# Patient Record
Sex: FEMALE | Race: BLACK OR AFRICAN AMERICAN | NOT HISPANIC OR LATINO | Employment: UNEMPLOYED | URBAN - METROPOLITAN AREA
[De-identification: names, ages, dates, MRNs, and addresses within clinical notes are randomized per-mention and may not be internally consistent; named-entity substitution may affect disease eponyms.]

---

## 2023-06-26 ENCOUNTER — HOSPITAL ENCOUNTER (OUTPATIENT)
Dept: DATA CONVERSION | Facility: HOSPITAL | Age: 33
End: 2023-06-27
Attending: OBSTETRICS & GYNECOLOGY
Payer: MEDICARE

## 2023-06-26 DIAGNOSIS — O9A.213 INJURY, POISONING AND CERTAIN OTHER CONSEQUENCES OF EXTERNAL CAUSES COMPLICATING PREGNANCY, THIRD TRIMESTER (HHS-HCC): ICD-10-CM

## 2023-06-26 DIAGNOSIS — R10.9 UNSPECIFIED ABDOMINAL PAIN: ICD-10-CM

## 2023-06-26 DIAGNOSIS — K62.89 OTHER SPECIFIED DISEASES OF ANUS AND RECTUM: ICD-10-CM

## 2023-06-26 DIAGNOSIS — O09.33 SUPERVISION OF PREGNANCY WITH INSUFFICIENT ANTENATAL CARE, THIRD TRIMESTER (HHS-HCC): ICD-10-CM

## 2023-06-26 DIAGNOSIS — O99.843 BARIATRIC SURGERY STATUS COMPLICATING PREGNANCY, THIRD TRIMESTER (HHS-HCC): ICD-10-CM

## 2023-06-26 DIAGNOSIS — O99.333 SMOKING (TOBACCO) COMPLICATING PREGNANCY, THIRD TRIMESTER (HHS-HCC): ICD-10-CM

## 2023-06-26 DIAGNOSIS — Z3A.28 28 WEEKS GESTATION OF PREGNANCY (HHS-HCC): ICD-10-CM

## 2023-06-26 DIAGNOSIS — F17.200 NICOTINE DEPENDENCE, UNSPECIFIED, UNCOMPLICATED: ICD-10-CM

## 2023-06-26 DIAGNOSIS — S59.811A: ICD-10-CM

## 2023-06-26 DIAGNOSIS — O99.613 DISEASES OF THE DIGESTIVE SYSTEM COMPLICATING PREGNANCY, THIRD TRIMESTER (HHS-HCC): ICD-10-CM

## 2023-06-26 DIAGNOSIS — O26.893 OTHER SPECIFIED PREGNANCY RELATED CONDITIONS, THIRD TRIMESTER (HHS-HCC): ICD-10-CM

## 2023-06-26 DIAGNOSIS — Z34.80 ENCOUNTER FOR SUPERVISION OF OTHER NORMAL PREGNANCY, UNSPECIFIED TRIMESTER (HHS-HCC): ICD-10-CM

## 2023-09-29 ENCOUNTER — HOSPITAL ENCOUNTER (OUTPATIENT)
Dept: DATA CONVERSION | Facility: HOSPITAL | Age: 33
Discharge: HOME | End: 2023-09-29
Attending: STUDENT IN AN ORGANIZED HEALTH CARE EDUCATION/TRAINING PROGRAM | Admitting: STUDENT IN AN ORGANIZED HEALTH CARE EDUCATION/TRAINING PROGRAM
Payer: MEDICARE

## 2023-09-29 VITALS
SYSTOLIC BLOOD PRESSURE: 132 MMHG | OXYGEN SATURATION: 100 % | RESPIRATION RATE: 18 BRPM | HEART RATE: 100 BPM | HEIGHT: 63 IN | DIASTOLIC BLOOD PRESSURE: 85 MMHG | TEMPERATURE: 98.2 F

## 2023-09-29 LAB
ALANINE AMINOTRANSFERASE (SGPT) (U/L) IN SER/PLAS: 11 U/L (ref 7–45)
ALBUMIN (G/DL) IN SER/PLAS: 4.3 G/DL (ref 3.4–5)
ALKALINE PHOSPHATASE (U/L) IN SER/PLAS: 52 U/L (ref 33–110)
ANION GAP IN SER/PLAS: 13 MMOL/L (ref 10–20)
ASPARTATE AMINOTRANSFERASE (SGOT) (U/L) IN SER/PLAS: 12 U/L (ref 9–39)
BILIRUBIN TOTAL (MG/DL) IN SER/PLAS: 0.3 MG/DL (ref 0–1.2)
CALCIUM (MG/DL) IN SER/PLAS: 9.3 MG/DL (ref 8.6–10.6)
CARBON DIOXIDE, TOTAL (MMOL/L) IN SER/PLAS: 25 MMOL/L (ref 21–32)
CHLORIDE (MMOL/L) IN SER/PLAS: 106 MMOL/L (ref 98–107)
CREATININE (MG/DL) IN SER/PLAS: 0.7 MG/DL (ref 0.5–1.05)
ERYTHROCYTE DISTRIBUTION WIDTH (RATIO) BY AUTOMATED COUNT: 13.2 % (ref 11.5–14.5)
ERYTHROCYTE MEAN CORPUSCULAR HEMOGLOBIN CONCENTRATION (G/DL) BY AUTOMATED: 30.1 G/DL (ref 32–36)
ERYTHROCYTE MEAN CORPUSCULAR VOLUME (FL) BY AUTOMATED COUNT: 77 FL (ref 80–100)
ERYTHROCYTES (10*6/UL) IN BLOOD BY AUTOMATED COUNT: 4.81 X10E12/L (ref 4–5.2)
GFR FEMALE: >90 ML/MIN/1.73M2
GLUCOSE (MG/DL) IN SER/PLAS: 88 MG/DL (ref 74–99)
HEMATOCRIT (%) IN BLOOD BY AUTOMATED COUNT: 36.9 % (ref 36–46)
HEMOGLOBIN (G/DL) IN BLOOD: 11.1 G/DL (ref 12–16)
LEUKOCYTES (10*3/UL) IN BLOOD BY AUTOMATED COUNT: 4.1 X10E9/L (ref 4.4–11.3)
NRBC (PER 100 WBCS) BY AUTOMATED COUNT: 0 /100 WBC (ref 0–0)
PLATELETS (10*3/UL) IN BLOOD AUTOMATED COUNT: 338 X10E9/L (ref 150–450)
POTASSIUM (MMOL/L) IN SER/PLAS: 4.2 MMOL/L (ref 3.5–5.3)
PROTEIN TOTAL: 6.7 G/DL (ref 6.4–8.2)
SODIUM (MMOL/L) IN SER/PLAS: 140 MMOL/L (ref 136–145)
UREA NITROGEN (MG/DL) IN SER/PLAS: 13 MG/DL (ref 6–23)
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
## 2023-10-02 ENCOUNTER — TELEPHONE (OUTPATIENT)
Dept: POSTPARTUM | Facility: HOSPITAL | Age: 33
End: 2023-10-02

## 2023-10-06 PROBLEM — K61.0 PERIANAL ABSCESS: Status: ACTIVE | Noted: 2023-10-06

## 2023-10-06 PROBLEM — R07.9 CHEST PAIN: Status: ACTIVE | Noted: 2022-11-27

## 2023-10-06 PROBLEM — O99.340 DEPRESSION AFFECTING PREGNANCY (HHS-HCC): Status: ACTIVE | Noted: 2019-09-18

## 2023-10-06 PROBLEM — K60.3 ANAL FISTULA: Chronic | Status: ACTIVE | Noted: 2019-09-20

## 2023-10-06 PROBLEM — F32.A DEPRESSION AFFECTING PREGNANCY (HHS-HCC): Status: ACTIVE | Noted: 2019-09-18

## 2023-10-06 PROBLEM — O99.210 MATERNAL OBESITY AFFECTING PREGNANCY, ANTEPARTUM (HHS-HCC): Status: ACTIVE | Noted: 2019-11-20

## 2023-10-06 PROBLEM — K21.9 GERD (GASTROESOPHAGEAL REFLUX DISEASE): Status: ACTIVE | Noted: 2017-07-03

## 2023-10-06 PROBLEM — N94.9 ROUND LIGAMENT PAIN: Status: ACTIVE | Noted: 2020-02-07

## 2023-10-06 PROBLEM — F41.1 GENERALIZED ANXIETY DISORDER: Status: ACTIVE | Noted: 2021-08-10

## 2023-10-06 PROBLEM — K61.1 PERIRECTAL ABSCESS: Status: ACTIVE | Noted: 2018-04-20

## 2023-10-06 PROBLEM — A63.0 CONDYLOMA: Status: ACTIVE | Noted: 2019-12-12

## 2023-10-06 PROBLEM — O99.810 GLUCOSE INTOLERANCE OF PREGNANCY (HHS-HCC): Status: ACTIVE | Noted: 2020-02-10

## 2023-10-06 PROBLEM — F32.2 MAJOR DEPRESSIVE DISORDER, SINGLE EPISODE, SEVERE WITHOUT PSYCHOTIC FEATURES (MULTI): Chronic | Status: ACTIVE | Noted: 2021-08-10

## 2023-10-06 PROBLEM — R79.89 ELEVATED LFTS: Status: ACTIVE | Noted: 2022-11-27

## 2023-10-06 PROBLEM — E66.01 MORBID OBESITY (MULTI): Status: ACTIVE | Noted: 2017-04-11

## 2023-10-06 PROBLEM — G47.33 OSA (OBSTRUCTIVE SLEEP APNEA): Status: ACTIVE | Noted: 2017-07-03

## 2023-10-06 PROBLEM — N83.201 CYST OF RIGHT OVARY: Status: ACTIVE | Noted: 2019-12-12

## 2023-10-06 PROBLEM — O99.810 ABNORMAL GLUCOSE AFFECTING PREGNANCY (HHS-HCC): Status: ACTIVE | Noted: 2020-01-30

## 2023-10-06 PROBLEM — O09.899 SUPERVISION OF OTHER HIGH RISK PREGNANCY, ANTEPARTUM (HHS-HCC): Status: ACTIVE | Noted: 2019-11-26

## 2023-10-06 PROBLEM — R79.89 ELEVATED TROPONIN: Status: ACTIVE | Noted: 2022-11-27

## 2023-10-06 RX ORDER — ACETAMINOPHEN 500 MG/1
1000 CAPSULE, LIQUID FILLED ORAL EVERY 6 HOURS PRN
COMMUNITY
Start: 2020-03-31

## 2023-10-06 RX ORDER — HYDROCORTISONE ACETATE 25 MG/1
25 SUPPOSITORY RECTAL 2 TIMES DAILY PRN
COMMUNITY
Start: 2022-05-23

## 2023-10-06 RX ORDER — DIPHENHYDRAMINE HCL 25 MG
25 TABLET ORAL EVERY 6 HOURS PRN
COMMUNITY
Start: 2023-09-08

## 2023-10-06 RX ORDER — DOCUSATE SODIUM 100 MG/1
100 CAPSULE, LIQUID FILLED ORAL 2 TIMES DAILY PRN
COMMUNITY
Start: 2023-09-08

## 2023-10-06 RX ORDER — ONDANSETRON 4 MG/1
4 TABLET, FILM COATED ORAL EVERY 12 HOURS PRN
COMMUNITY
Start: 2022-11-29

## 2023-10-06 RX ORDER — CETIRIZINE HYDROCHLORIDE 10 MG/1
10 TABLET ORAL
COMMUNITY
Start: 2021-11-10

## 2023-10-06 RX ORDER — AMMONIUM LACTATE 12 G/100G
LOTION TOPICAL 2 TIMES DAILY
COMMUNITY
Start: 2022-05-23

## 2023-10-06 RX ORDER — NALOXONE HYDROCHLORIDE 4 MG/.1ML
4 SPRAY NASAL AS NEEDED
COMMUNITY
Start: 2021-08-25

## 2023-10-06 RX ORDER — IBUPROFEN 600 MG/1
600 TABLET ORAL 4 TIMES DAILY
COMMUNITY
Start: 2023-09-08

## 2023-10-06 RX ORDER — BACITRACIN 500 [USP'U]/G
OINTMENT TOPICAL
COMMUNITY
Start: 2023-06-05

## 2023-10-06 RX ORDER — HYDROCORTISONE 25 MG/G
CREAM TOPICAL
COMMUNITY
Start: 2020-03-02

## 2023-10-06 RX ORDER — FERROUS SULFATE 325(65) MG
325 TABLET ORAL 2 TIMES DAILY
COMMUNITY
Start: 2022-11-28

## 2023-10-06 RX ORDER — BENZONATATE 100 MG/1
100 CAPSULE ORAL 3 TIMES DAILY PRN
COMMUNITY
Start: 2022-11-29

## 2023-10-06 RX ORDER — IBUPROFEN 200 MG
1 TABLET ORAL EVERY 24 HOURS
COMMUNITY
Start: 2022-11-28

## 2023-10-06 RX ORDER — OMEPRAZOLE 40 MG/1
1 CAPSULE, DELAYED RELEASE ORAL DAILY
COMMUNITY
Start: 2023-07-12

## 2023-10-11 ENCOUNTER — APPOINTMENT (OUTPATIENT)
Dept: DERMATOLOGY | Facility: CLINIC | Age: 33
End: 2023-10-11
Payer: COMMERCIAL

## 2023-10-12 ENCOUNTER — APPOINTMENT (OUTPATIENT)
Dept: DERMATOLOGY | Facility: CLINIC | Age: 33
End: 2023-10-12
Payer: COMMERCIAL

## 2023-10-14 ENCOUNTER — PHARMACY VISIT (OUTPATIENT)
Dept: PHARMACY | Facility: CLINIC | Age: 33
End: 2023-10-14
Payer: MEDICARE

## 2023-10-14 RX ORDER — KETOCONAZOLE 20 MG/ML
SHAMPOO, SUSPENSION TOPICAL
Qty: 120 ML | Refills: 1 | OUTPATIENT
Start: 2023-06-23 | End: 2023-10-14 | Stop reason: SDUPTHER

## 2023-11-16 ENCOUNTER — HOSPITAL ENCOUNTER (EMERGENCY)
Facility: HOSPITAL | Age: 33
Discharge: HOME | End: 2023-11-16
Payer: MEDICARE

## 2023-11-16 PROCEDURE — 4500999001 HC ED NO CHARGE

## 2023-12-07 ENCOUNTER — HOSPITAL ENCOUNTER (EMERGENCY)
Facility: HOSPITAL | Age: 33
Discharge: HOME | End: 2023-12-07
Attending: EMERGENCY MEDICINE
Payer: MEDICARE

## 2023-12-07 VITALS
SYSTOLIC BLOOD PRESSURE: 137 MMHG | HEIGHT: 63 IN | OXYGEN SATURATION: 98 % | HEART RATE: 76 BPM | DIASTOLIC BLOOD PRESSURE: 92 MMHG | WEIGHT: 180 LBS | BODY MASS INDEX: 31.89 KG/M2 | TEMPERATURE: 96.6 F | RESPIRATION RATE: 20 BRPM

## 2023-12-07 DIAGNOSIS — B80 PINWORMS: Primary | ICD-10-CM

## 2023-12-07 DIAGNOSIS — M79.601 PAIN OF RIGHT UPPER EXTREMITY: ICD-10-CM

## 2023-12-07 PROCEDURE — 99283 EMERGENCY DEPT VISIT LOW MDM: CPT | Performed by: EMERGENCY MEDICINE

## 2023-12-07 PROCEDURE — 99284 EMERGENCY DEPT VISIT MOD MDM: CPT | Performed by: EMERGENCY MEDICINE

## 2023-12-07 PROCEDURE — 2500000001 HC RX 250 WO HCPCS SELF ADMINISTERED DRUGS (ALT 637 FOR MEDICARE OP): Mod: SE | Performed by: EMERGENCY MEDICINE

## 2023-12-07 RX ORDER — TRAMADOL HYDROCHLORIDE 50 MG/1
50 TABLET ORAL EVERY 6 HOURS PRN
Qty: 10 TABLET | Refills: 0 | Status: SHIPPED | OUTPATIENT
Start: 2023-12-07 | End: 2023-12-10

## 2023-12-07 RX ORDER — IBUPROFEN 600 MG/1
600 TABLET ORAL ONCE
Status: COMPLETED | OUTPATIENT
Start: 2023-12-07 | End: 2023-12-07

## 2023-12-07 RX ORDER — ALBENDAZOLE 200 MG/1
400 TABLET, FILM COATED ORAL ONCE
Qty: 4 TABLET | Refills: 0 | Status: SHIPPED | OUTPATIENT
Start: 2023-12-07 | End: 2023-12-07

## 2023-12-07 RX ADMIN — IBUPROFEN 600 MG: 600 TABLET, FILM COATED ORAL at 14:21

## 2023-12-07 ASSESSMENT — COLUMBIA-SUICIDE SEVERITY RATING SCALE - C-SSRS
1. IN THE PAST MONTH, HAVE YOU WISHED YOU WERE DEAD OR WISHED YOU COULD GO TO SLEEP AND NOT WAKE UP?: NO
2. HAVE YOU ACTUALLY HAD ANY THOUGHTS OF KILLING YOURSELF?: NO
6. HAVE YOU EVER DONE ANYTHING, STARTED TO DO ANYTHING, OR PREPARED TO DO ANYTHING TO END YOUR LIFE?: NO

## 2023-12-07 NOTE — ED PROVIDER NOTES
"HPI   Chief Complaint   Patient presents with    Headache       HPI:  33-year-old female who denies any significant medical history presents the emergency department with her 2 children with a chief complaint of pinworms.  She states that she has had the rash on her right forearm for \"a long time\" at least a year and has been toyed told that it is a keloid but she says that she has a lot of like itching pain burning sensation in that rash and she is concerned about having pinworms.  She states that she has noticed in her daughter that she has been scratching a lot at her rectal area and it went on for a long time she actually feels bad because she did not know what was going on but then she started googling symptoms and then she could see worms that look like pinworms in her anal area.  She plans on taking her children to [place] ER for evaluation when she leaves the emergency department here.  She states she has been using calamine lotion and applying it to the rash she is very embarrassed by the rash and feels that she needs some kind of oral medications to help treat it because she tried topical creams in the past and nothing has seemed to work.  She says that she went to an appointment at dermatology today but she said they would not see her because she did not have her ID on her and she states that they were pretty rude and she has not had any subspecialist evaluate this rash.    Limitations to history: Labile mood  Independent Historians: None  External Records Reviewed: EMR records  ------------------------------------------------------------------------------------------------------------------------------------------  ROS: a ten point review of systems was performed and negative except as per HPI.  ------------------------------------------------------------------------------------------------------------------------------------------  PMH / PSH: as per HPI, reviewed in EMR and discussed with the patient " []  MEDS:  reviewed in EMR and discussed with the patient []  ALLERGIES: reviewed in EMR[]  SocH:  as per HPI, otherwise reviewed in EMR []  FH:  as per HPI, otherwise reviewed in EMR []  ------------------------------------------------------------------------------------------------------------------------------------------  Physical Exam:  VS: As documented in the triage note and EMR flowsheet from this visit was reviewed  General: Well appearing. No acute distress.   Eyes:  Extraocular movements grossly intact. No scleral icterus.   HEENT: Atraumatic. Normocephalic.    Neck: Supple. No gross masses  MSK: Symmetric muscle bulk. No gross step offs or deformities.  Skin: Right forearm with dried pink calamine lotion over top of a raised large keloid area with a central pink area no obvious overlying infection no other abnormalities nontender to palpation warm extremities  Neuro: Speech fluent. Awake. Alert. Appropriate conversation.  Psych: Labile mood, possible delusion  ------------------------------------------------------------------------------------------------------------------------------------------  Hospital Course / Medical Decision Making:  The patient appears anxious that she is only requesting to see 1 provider and is embarrassed about the rash on her arm and does not want anyone else to see it.  I saw the patient in super track to avoid multiple providers having to see her her physical exam is reassuring I did review medical records and she has been seen in the ER with similar complaints in the past as well.  Her right forearm does seem to have a large keloid area anteriorly but I have no concern for infection no cellulitis and no concern for any kind of retained foreign body or parasitic disease process.  She tells me that clinically she sees worms are in the rectal area of her children and wants to know how to get rid of those and decontaminate the house I explained how and warm infected infection  spread and the children do go to .  I wanted to prescribe a topical cream for her right forearm however she was reluctant to try this as she says she has tried this in the past and it does not seem to work and really prefer to have an oral medication if her I did not examine her children but have obliged by giving her a prescription for albendazole and have advised her to follow-up with dermatology or plastic surgery for consideration of cosmetic repair    Although the patient is concerned about possible worms in her forearm it could be a delusion that she is having she denies any psychiatric mental health diagnoses the children that are with her do appear well-groomed and safe she says that she has not been breast-feeding and has been using a bottle to feed her son and I do not feel that they are in any immediate threat at this time and can keep the family structure together because I do not feel like she is a threat to herself right now or to others.  She was discharged from the ED with plan to go to [place] ER to have her children evaluated as well    Patient care discussed with:   Social Determinants affecting care:     Final diagnosis and disposition: [] Rash, possible pinworms            Neema Kimble MD                                      [place] Coma Scale Score: 15                  Patient History   History reviewed. No pertinent past medical history.  History reviewed. No pertinent surgical history.  No family history on file.  Social History     Tobacco Use    Smoking status: Not on file    Smokeless tobacco: Not on file   Substance Use Topics    Alcohol use: Not on file    Drug use: Not on file       Physical Exam   ED Triage Vitals [12/07/23 1332]   Temp Heart Rate Resp BP   35.9 °C (96.6 °F) 76 20 (!) 137/92      SpO2 Temp src Heart Rate Source Patient Position   98 % -- -- --      BP Location FiO2 (%)     -- --       Physical Exam    ED Course & MDM   Diagnoses as of 12/07/23 1639    Pinworms   Pain of right upper extremity       Medical Decision Making      Procedure  Procedures     Neema Kimble MD  12/07/23 8619

## 2023-12-21 ENCOUNTER — HOSPITAL ENCOUNTER (EMERGENCY)
Facility: HOSPITAL | Age: 33
Discharge: HOME | End: 2023-12-21

## 2023-12-21 VITALS
HEIGHT: 63 IN | WEIGHT: 180 LBS | BODY MASS INDEX: 31.89 KG/M2 | HEART RATE: 71 BPM | DIASTOLIC BLOOD PRESSURE: 74 MMHG | TEMPERATURE: 97.3 F | RESPIRATION RATE: 16 BRPM | SYSTOLIC BLOOD PRESSURE: 125 MMHG | OXYGEN SATURATION: 98 %

## 2023-12-21 PROCEDURE — 4500999001 HC ED NO CHARGE

## 2023-12-21 ASSESSMENT — COLUMBIA-SUICIDE SEVERITY RATING SCALE - C-SSRS
1. IN THE PAST MONTH, HAVE YOU WISHED YOU WERE DEAD OR WISHED YOU COULD GO TO SLEEP AND NOT WAKE UP?: NO
6. HAVE YOU EVER DONE ANYTHING, STARTED TO DO ANYTHING, OR PREPARED TO DO ANYTHING TO END YOUR LIFE?: NO
2. HAVE YOU ACTUALLY HAD ANY THOUGHTS OF KILLING YOURSELF?: NO

## 2023-12-21 ASSESSMENT — PAIN - FUNCTIONAL ASSESSMENT: PAIN_FUNCTIONAL_ASSESSMENT: 0-10

## 2023-12-21 ASSESSMENT — PAIN DESCRIPTION - LOCATION: LOCATION: HEAD

## 2023-12-21 ASSESSMENT — PAIN SCALES - GENERAL: PAINLEVEL_OUTOF10: 10 - WORST POSSIBLE PAIN

## 2023-12-21 NOTE — ED TRIAGE NOTES
Patient Education       1. Floragen/biogaia - probiotic  2. Nasal saline spray daily.  3. Vitamin D 2000 international units daily.    Well-Child Checkup: 11 to 13 Years     Physical activity is key to lifelong good health. Encourage your child to find activities that he or she enjoys.   Between ages 11 and 13, your child will grow and change a lot. It’s important to keep having yearly checkups so the healthcare provider can track this progress. As your child enters puberty, he or she may become more embarrassed about having a checkup. Reassure your child that the exam is normal and necessary. Be aware that the healthcare provider may ask to talk with the child without you in the exam room.   School and social issues  Here are some topics you, your child, and the healthcare provider may want to discuss during this visit:   · School performance. How is your child doing in school? Is homework finished on time? Does your child stay organized? These are skills you can help with. Keep in mind that a drop in school performance can be a sign of other problems.  · Friendships. Do you like your child’s friends? Do the friendships seem healthy? Make sure to talk to your child about who his or her friends are and how they spend time together. This is the age when peer pressure can start to be a problem.  · Life at home. How is your child’s behavior? Does he or she get along with others in the family? Is he or she respectful of you, other adults, and authority? Does your child participate in family events, or does he or she withdraw from other family members?  · Risky behaviors. It’s not too early to start talking to your child about drugs, alcohol, smoking, and sex. Make sure your child understands that these are not activities he or she should do, even if friends are. Answer your child’s questions, and don’t be afraid to ask questions of your own. Make sure your child knows he or she can always come to you for help. If  Pt to ed with c/o headache x2 days.   you’re not sure how to approach these topics, talk to the healthcare provider for advice.  Entering puberty  Puberty is the stage when a child begins to develop sexually into an adult. It usually starts between 9 and 14 for girls, and between 12 and 16 for boys. Here is some of what you can expect when puberty begins:   · Acne and body odor. Hormones that increase during puberty can cause acne (pimples) on the face and body. Hormones can also increase sweating and cause a stronger body odor. At this age, your child should begin to shower or bathe daily. Encourage your child to use deodorant and acne products as needed.  · Body changes in girls. Early in puberty, breasts begin to develop. One breast often starts to grow before the other. This is normal. Hair begins to grow in the pubic area, under the arms, and on the legs. Around 2 years after breasts begin to grow, a girl will start having monthly periods (menstruation). To help prepare your daughter for this change, talk to her about periods, what to expect, and how to use feminine products.  · Body changes in boys. At the start of puberty, the testicles drop lower and the scrotum darkens and becomes looser. Hair begins to grow in the pubic area, under the arms, and on the legs, chest, and face. The voice changes, becoming lower and deeper. As the penis grows and matures, erections and “wet dreams” begin to happen. Reassure your son that this is normal.  · Emotional changes. Along with these physical changes, you’ll likely notice changes in your child’s personality. You may notice your child developing an interest in dating and becoming “more than friends” with others. Also, many kids become tatum and develop an attitude around puberty. This can be frustrating, but it is very normal. Try to be patient and consistent. Encourage conversations, even when your child doesn’t seem to want to talk. No matter how your child acts, he or she still needs a parent.  Nutrition  and exercise tips  Today, kids are less active and eat more junk food than ever before. Your child is starting to make choices about what to eat and how active to be. You can’t always have the final say, but you can help your child develop healthy habits. Here are some tips:   · Help your child get at least 30 to 60 minutes of activity every day. The time can be broken up throughout the day. If the weather’s bad or you’re worried about safety, find supervised indoor activities.   · Limit “screen time” to 1 hour each day. This includes time spent watching TV, playing video games, using the computer, and texting. If your child has a TV, computer, or video game console in the bedroom, consider replacing it with a music player. For many kids, dancing and singing are fun ways to get moving.  · Limit sugary drinks. Soda, juice, and sports drinks lead to unhealthy weight gain and tooth decay. Water and low-fat or nonfat milk are best to drink. In moderation (no more than 8 to 12 ounces daily), 100% fruit juice is OK. Save soda and other sugary drinks for special occasions.  · Have at least one family meal together each day. Busy schedules often limit time for sitting and talking. Sitting and eating together allows for family time. It also lets you see what and how your child eats.  · Pay attention to portions. Serve portions that make sense for your kids. Let them stop eating when they’re full--don’t make them clean their plates. Be aware that many kids’ appetites increase during puberty. If your child is still hungry after a meal, offer seconds of vegetables or fruit.  · Serve and encourage healthy foods. Your child is making more food decisions on his or her own. All foods have a place in a balanced diet. Fruits, vegetables, lean meats, and whole grains should be eaten every day. Save less healthy foods--like french fries, candy, and chips--for a special occasion. When your child does choose to eat junk food, consider  making the child buy it with his or her own money. Ask your child to tell you when he or she buys junk food or swaps food with friends.  · Bring your child to the dentist at least twice a year for teeth cleaning and a checkup.  Sleeping tips  At this age, your child needs about 10 hours of sleep each night. Here are some tips:   · Set a bedtime and make sure your child follows it each night.  · TV, computer, and video games can agitate a child and make it hard to calm down for the night. Turn them off at least an hour before bed. Instead, encourage your child to read before bed.  · If your child has a cell phone, make sure it’s turned off at night.  · Don’t let your child go to sleep very late or sleep in on weekends. This can disrupt sleep patterns and make it harder to sleep on school nights.  · Remind your child to brush and floss his or her teeth before bed. Briefly supervise your child's dental self-care once a week to make sure of proper technique.  Safety tips  Recommendations for keeping your child safe include the following:   · When riding a bike, roller-skating, or using a scooter or skateboard, your child should wear a helmet with the strap fastened. When using roller skates, a scooter, or a skateboard, it is also a good idea for your child to wear wrist guards, elbow pads, and knee pads.  · In the car, all children younger than 13 should sit in the back seat. Children shorter than 4'9\" (57 inches) should continue to use a booster seat to properly position the seat belt.  · If your child has a cell phone or portable music player, make sure these are used safely and responsibly. Do not allow your child to talk on the phone, text, or listen to music with headphones while he or she is riding a bike or walking outdoors. Remind your child to pay special attention when crossing the street.  · Constant loud music can cause hearing damage, so monitor the volume on your child’s music player. Many players let you  set a limit for how loud the volume can be turned up. Check the directions for details.  · At this age, kids may start taking risks that could be dangerous to their health or well-being. Sometimes bad decisions stem from peer pressure. Other times, kids just don’t think ahead about what could happen. Teach your child the importance of making good decisions. Talk about how to recognize peer pressure and come up with strategies for coping with it.  · Sudden changes in your child’s mood, behavior, friendships, or activities can be warning signs of problems at school or in other aspects of your child’s life. If you notice signs like these, talk to your child and to the staff at your child’s school. The healthcare provider may also be able to offer advice.  Vaccines  Based on recommendations from the American Association of Pediatrics, at this visit your child may receive the following vaccines:   · Human papillomavirus (HPV) (ages 11 to 12)  · Influenza (flu), annually  · Meningococcal (ages 11 to 12)  · Tetanus, diphtheria, and pertussis (ages 11 to 12)  Stay on top of social media  In this wired age, kids are much more “connected” with friends--possibly some they’ve never met in person. To teach your child how to use social media responsibly:   · Set limits for the use of cell phones, the computer, and the Internet. Remind your child that you can check the web browser history and cell phone logs to know how these devices are being used. Use parental controls and passwords to block access to inappropriate websites. Use privacy settings on websites so only your child’s friends can view his or her profile.  · Explain to your child the dangers of giving out personal information online. Teach your child not to share his or her phone number, address, picture, or other personal details with online friends without your permission.  · Make sure your child understands that things he or she “says” on the Internet are never  private. Posts made on websites like Facebook, Brainpark, and Cumul[employer] can be seen by people they weren’t intended for. Posts can easily be misunderstood and can even cause trouble for you or your child. Supervise your child’s use of social networks, chat rooms, and email.  Y[employer] last reviewed this educational content on 4/1/2020  © 0809-3087 [employer]. All rights reserved. This information is not intended as a substitute for professional medical care. Always follow your healthcare professional's instructions.

## 2024-03-06 NOTE — CONSULTS
Service:   Service: Wound Care     Consult:  Consult requested by (Attending Name): Brielle Page   Reason: upper extremity wound in pregnancy     History of Present Illness:   HPI:    OPAL HOUSE is a 33 year old Female    Review Family/Social History and ROS:   Social History:    Smoking Status: moderate user (uses 11-30 cig/day,  OR 0.5-1.5 ppd, OR 2-3 cans/pouches loose leaf tobacco per week, OR 0.5-1.5 vape pods per day) (1)   Alcohol Use: denies (1)   Drug Use: denies  (1)   Drug 2 Use: denies  (1)            Allergies:  ·  No Known Allergies :       Assessment:      Wound location: right forearm    size: appears smaller approximately 10 x 3 cm                           undermining: no     tracking: no                                        Wound type: chronic wound   Wound bed: granulation tissue,   Draining: thick yellow drainage   Periwound skin: new scarring noted at proximal and distal edges   Therapeutic surface: Versacare Bed     Recommendation: every 3 days   Irrigate with normal saline or wound cleanser. Patient can wash with soap and water- on day of dressing change, patient can even let dressing get wet with water in shower to allow the dressing to be removed easier  cover with Hydrafera blue Ready - foam to wound / printed side on the outside   Cover with ABD dressing   Apply ACE wrap     Wound location: Right breast    size: approximately 3 x 2 cm                             undermining: no      tracking: no                                        Wound type: partial thickness   Wound bed: shiny pink/ red  Draining: serous   Periwound skin: pink intact       Recommendation: every 3 days   Irrigate with normal saline or wound cleanser. Patient can wash with soap and water- on day of dressing change, patient can even let dressing get wet with water in shower to allow the dressing to be removed easier  cover with Aquacael AG   Apply dry sterile dressing         Patient states that she forgot the  "\"special shampoo\" for her head. Requesting script for  ketoconazole shampoo (1-2%) 2 times a week.    Please review recommendation. If you agree with this recommendation, please enter as a wound orders in EMR. Thank you.    Plan:  While inpatient, call with questions or if condition changes.    Dayanna AZAR RN CWON  Pager [phone]/ [facility]     Consult Status:  Consult Order ID: [id number]       Electronic Signatures:  Dayanna Downey (STAFF N)  (Signed 27-Jun-2023 12:36)   Authored: Service, History of Present Illness, Review  Family/Social History and ROS, Allergies, Assessment/Recommendations, Note Completion      Last Updated: 27-Jun-2023 12:36 by Dayanna Downey (STAFF N)    References:  1.  Data Referenced From \"Triage Note - OB v4\" 26-Jun-2023 23:36   "

## 2024-06-10 ENCOUNTER — APPOINTMENT (OUTPATIENT)
Dept: DERMATOLOGY | Facility: HOSPITAL | Age: 34
End: 2024-06-10
Payer: MEDICARE

## 2024-08-28 ENCOUNTER — APPOINTMENT (OUTPATIENT)
Dept: DERMATOLOGY | Facility: CLINIC | Age: 34
End: 2024-08-28
Payer: MEDICARE

## 2024-08-30 ENCOUNTER — ANESTHESIA EVENT (OUTPATIENT)
Dept: OBSTETRICS AND GYNECOLOGY | Facility: HOSPITAL | Age: 34
End: 2024-08-30
Payer: MEDICARE

## 2024-08-30 ENCOUNTER — HOSPITAL ENCOUNTER (INPATIENT)
Facility: HOSPITAL | Age: 34
LOS: 1 days | Discharge: LEFT BEFORE DISCHARGE | DRG: 817 | End: 2024-08-30
Attending: OBSTETRICS & GYNECOLOGY | Admitting: SPECIALIST
Payer: MEDICARE

## 2024-08-30 ENCOUNTER — ANESTHESIA (OUTPATIENT)
Dept: OBSTETRICS AND GYNECOLOGY | Facility: HOSPITAL | Age: 34
End: 2024-08-30
Payer: MEDICARE

## 2024-08-30 ENCOUNTER — ANESTHESIA (OUTPATIENT)
Dept: OBSTETRICS AND GYNECOLOGY | Facility: HOSPITAL | Age: 34
DRG: 817 | End: 2024-08-30
Payer: MEDICARE

## 2024-08-30 VITALS
HEART RATE: 118 BPM | SYSTOLIC BLOOD PRESSURE: 141 MMHG | TEMPERATURE: 97.9 F | DIASTOLIC BLOOD PRESSURE: 79 MMHG | OXYGEN SATURATION: 98 % | HEIGHT: 63 IN | RESPIRATION RATE: 16 BRPM | WEIGHT: 189.6 LBS | BODY MASS INDEX: 33.59 KG/M2

## 2024-08-30 LAB
ABO GROUP (TYPE) IN BLOOD: NORMAL
ANTIBODY SCREEN: NORMAL
BACTERIAL VAGINOSIS VAG-IMP: NORMAL
BLOOD EXPIRATION DATE: NORMAL
BLOOD EXPIRATION DATE: NORMAL
CLUE CELLS VAG LPF-#/AREA: NORMAL /[LPF]
DISPENSE STATUS: NORMAL
DISPENSE STATUS: NORMAL
NUGENT SCORE: 4
PRODUCT BLOOD TYPE: 6200
PRODUCT BLOOD TYPE: 6200
PRODUCT CODE: NORMAL
PRODUCT CODE: NORMAL
RH FACTOR (ANTIGEN D): NORMAL
UNIT ABO: NORMAL
UNIT ABO: NORMAL
UNIT NUMBER: NORMAL
UNIT NUMBER: NORMAL
UNIT RH: NORMAL
UNIT RH: NORMAL
UNIT VOLUME: 350
UNIT VOLUME: 350
XM INTEP: NORMAL
XM INTEP: NORMAL
YEAST VAG WET PREP-#/AREA: NORMAL

## 2024-08-30 PROCEDURE — 2500000004 HC RX 250 GENERAL PHARMACY W/ HCPCS (ALT 636 FOR OP/ED)

## 2024-08-30 PROCEDURE — 87205 SMEAR GRAM STAIN: CPT

## 2024-08-30 PROCEDURE — 2500000002 HC RX 250 W HCPCS SELF ADMINISTERED DRUGS (ALT 637 FOR MEDICARE OP, ALT 636 FOR OP/ED)

## 2024-08-30 PROCEDURE — P9045 ALBUMIN (HUMAN), 5%, 250 ML: HCPCS | Mod: JZ | Performed by: NURSE ANESTHETIST, CERTIFIED REGISTERED

## 2024-08-30 PROCEDURE — 2500000004 HC RX 250 GENERAL PHARMACY W/ HCPCS (ALT 636 FOR OP/ED): Mod: JZ | Performed by: NURSE ANESTHETIST, CERTIFIED REGISTERED

## 2024-08-30 PROCEDURE — 2500000005 HC RX 250 GENERAL PHARMACY W/O HCPCS

## 2024-08-30 PROCEDURE — 7100000016 HC LABOR RECOVERY PER HOUR: Performed by: SPECIALIST

## 2024-08-30 PROCEDURE — 87081 CULTURE SCREEN ONLY: CPT

## 2024-08-30 PROCEDURE — 3E0H7GC INTRODUCTION OF OTHER THERAPEUTIC SUBSTANCE INTO LOWER GI, VIA NATURAL OR ARTIFICIAL OPENING: ICD-10-PCS | Performed by: SPECIALIST

## 2024-08-30 PROCEDURE — 2500000005 HC RX 250 GENERAL PHARMACY W/O HCPCS: Performed by: NURSE ANESTHETIST, CERTIFIED REGISTERED

## 2024-08-30 PROCEDURE — 36415 COLL VENOUS BLD VENIPUNCTURE: CPT | Performed by: NURSE PRACTITIONER

## 2024-08-30 PROCEDURE — 10D17ZZ EXTRACTION OF PRODUCTS OF CONCEPTION, RETAINED, VIA NATURAL OR ARTIFICIAL OPENING: ICD-10-PCS | Performed by: SPECIALIST

## 2024-08-30 PROCEDURE — 1120000001 HC OB PRIVATE ROOM DAILY

## 2024-08-30 PROCEDURE — 86920 COMPATIBILITY TEST SPIN: CPT

## 2024-08-30 PROCEDURE — 59812 TREATMENT OF MISCARRIAGE: CPT | Performed by: SPECIALIST

## 2024-08-30 PROCEDURE — 36415 COLL VENOUS BLD VENIPUNCTURE: CPT

## 2024-08-30 PROCEDURE — 88305 TISSUE EXAM BY PATHOLOGIST: CPT | Performed by: PATHOLOGY

## 2024-08-30 PROCEDURE — 3700000014 HC AN EPIDURAL BLOCK CHARGE: Performed by: SPECIALIST

## 2024-08-30 PROCEDURE — 2500000001 HC RX 250 WO HCPCS SELF ADMINISTERED DRUGS (ALT 637 FOR MEDICARE OP)

## 2024-08-30 PROCEDURE — 99215 OFFICE O/P EST HI 40 MIN: CPT

## 2024-08-30 PROCEDURE — 88305 TISSUE EXAM BY PATHOLOGIST: CPT | Mod: TC,SUR

## 2024-08-30 PROCEDURE — 0UH97HZ INSERTION OF CONTRACEPTIVE DEVICE INTO UTERUS, VIA NATURAL OR ARTIFICIAL OPENING: ICD-10-PCS | Performed by: SPECIALIST

## 2024-08-30 PROCEDURE — 2500000001 HC RX 250 WO HCPCS SELF ADMINISTERED DRUGS (ALT 637 FOR MEDICARE OP): Performed by: NURSE PRACTITIONER

## 2024-08-30 PROCEDURE — 10907ZC DRAINAGE OF AMNIOTIC FLUID, THERAPEUTIC FROM PRODUCTS OF CONCEPTION, VIA NATURAL OR ARTIFICIAL OPENING: ICD-10-PCS | Performed by: SPECIALIST

## 2024-08-30 PROCEDURE — 4500999001 HC ED NO CHARGE

## 2024-08-30 PROCEDURE — 86901 BLOOD TYPING SEROLOGIC RH(D): CPT

## 2024-08-30 PROCEDURE — 59409 OBSTETRICAL CARE: CPT | Performed by: SPECIALIST

## 2024-08-30 RX ORDER — ONDANSETRON 4 MG/1
4 TABLET, FILM COATED ORAL EVERY 6 HOURS PRN
Status: DISCONTINUED | OUTPATIENT
Start: 2024-08-30 | End: 2024-08-30 | Stop reason: HOSPADM

## 2024-08-30 RX ORDER — LIDOCAINE HYDROCHLORIDE 20 MG/ML
INJECTION, SOLUTION INFILTRATION; PERINEURAL AS NEEDED
Status: DISCONTINUED | OUTPATIENT
Start: 2024-08-30 | End: 2024-08-30

## 2024-08-30 RX ORDER — OXYTOCIN 10 [USP'U]/ML
10 INJECTION, SOLUTION INTRAMUSCULAR; INTRAVENOUS ONCE AS NEEDED
Status: COMPLETED | OUTPATIENT
Start: 2024-08-30 | End: 2024-08-30

## 2024-08-30 RX ORDER — ALBUMIN HUMAN 50 G/1000ML
SOLUTION INTRAVENOUS AS NEEDED
Status: DISCONTINUED | OUTPATIENT
Start: 2024-08-30 | End: 2024-08-30

## 2024-08-30 RX ORDER — LOPERAMIDE HYDROCHLORIDE 2 MG/1
4 CAPSULE ORAL EVERY 2 HOUR PRN
Status: DISCONTINUED | OUTPATIENT
Start: 2024-08-30 | End: 2024-08-30 | Stop reason: HOSPADM

## 2024-08-30 RX ORDER — OXYTOCIN 10 [USP'U]/ML
10 INJECTION, SOLUTION INTRAMUSCULAR; INTRAVENOUS ONCE AS NEEDED
Status: DISCONTINUED | OUTPATIENT
Start: 2024-08-30 | End: 2024-08-30 | Stop reason: HOSPADM

## 2024-08-30 RX ORDER — DIPHENHYDRAMINE HCL 25 MG
50 CAPSULE ORAL EVERY 6 HOURS PRN
Status: DISCONTINUED | OUTPATIENT
Start: 2024-08-30 | End: 2024-08-30 | Stop reason: HOSPADM

## 2024-08-30 RX ORDER — METOCLOPRAMIDE HYDROCHLORIDE 5 MG/ML
10 INJECTION INTRAMUSCULAR; INTRAVENOUS EVERY 6 HOURS PRN
Status: DISCONTINUED | OUTPATIENT
Start: 2024-08-30 | End: 2024-08-30 | Stop reason: HOSPADM

## 2024-08-30 RX ORDER — OXYTOCIN 10 [USP'U]/ML
INJECTION, SOLUTION INTRAMUSCULAR; INTRAVENOUS
Status: DISPENSED
Start: 2024-08-30 | End: 2024-08-30

## 2024-08-30 RX ORDER — LIDOCAINE HYDROCHLORIDE 10 MG/ML
30 INJECTION INFILTRATION; PERINEURAL ONCE AS NEEDED
Status: DISCONTINUED | OUTPATIENT
Start: 2024-08-30 | End: 2024-08-30 | Stop reason: HOSPADM

## 2024-08-30 RX ORDER — TERBUTALINE SULFATE 1 MG/ML
0.25 INJECTION SUBCUTANEOUS ONCE AS NEEDED
Status: DISCONTINUED | OUTPATIENT
Start: 2024-08-30 | End: 2024-08-30 | Stop reason: HOSPADM

## 2024-08-30 RX ORDER — HYDROMORPHONE HYDROCHLORIDE 1 MG/ML
1 INJECTION, SOLUTION INTRAMUSCULAR; INTRAVENOUS; SUBCUTANEOUS ONCE
Status: COMPLETED | OUTPATIENT
Start: 2024-08-30 | End: 2024-08-30

## 2024-08-30 RX ORDER — CEFAZOLIN SODIUM 2 G/100ML
2 INJECTION, SOLUTION INTRAVENOUS ONCE
Status: COMPLETED | OUTPATIENT
Start: 2024-08-30 | End: 2024-08-30

## 2024-08-30 RX ORDER — SUCCINYLCHOLINE CHLORIDE 20 MG/ML
INJECTION INTRAMUSCULAR; INTRAVENOUS AS NEEDED
Status: DISCONTINUED | OUTPATIENT
Start: 2024-08-30 | End: 2024-08-30

## 2024-08-30 RX ORDER — METHYLERGONOVINE MALEATE 0.2 MG/ML
0.2 INJECTION INTRAVENOUS ONCE AS NEEDED
Status: DISCONTINUED | OUTPATIENT
Start: 2024-08-30 | End: 2024-08-30 | Stop reason: HOSPADM

## 2024-08-30 RX ORDER — HYDRALAZINE HYDROCHLORIDE 20 MG/ML
5 INJECTION INTRAMUSCULAR; INTRAVENOUS ONCE AS NEEDED
Status: DISCONTINUED | OUTPATIENT
Start: 2024-08-30 | End: 2024-08-30 | Stop reason: HOSPADM

## 2024-08-30 RX ORDER — FENTANYL CITRATE 50 UG/ML
INJECTION, SOLUTION INTRAMUSCULAR; INTRAVENOUS AS NEEDED
Status: DISCONTINUED | OUTPATIENT
Start: 2024-08-30 | End: 2024-08-30

## 2024-08-30 RX ORDER — TRANEXAMIC ACID 100 MG/ML
1000 INJECTION, SOLUTION INTRAVENOUS ONCE AS NEEDED
Status: COMPLETED | OUTPATIENT
Start: 2024-08-30 | End: 2024-08-30

## 2024-08-30 RX ORDER — NIFEDIPINE 10 MG/1
10 CAPSULE ORAL ONCE AS NEEDED
Status: DISCONTINUED | OUTPATIENT
Start: 2024-08-30 | End: 2024-08-30 | Stop reason: HOSPADM

## 2024-08-30 RX ORDER — METOCLOPRAMIDE 10 MG/1
10 TABLET ORAL EVERY 6 HOURS PRN
Status: DISCONTINUED | OUTPATIENT
Start: 2024-08-30 | End: 2024-08-30 | Stop reason: HOSPADM

## 2024-08-30 RX ORDER — CEFAZOLIN SODIUM 2 G/100ML
INJECTION, SOLUTION INTRAVENOUS
Status: DISCONTINUED
Start: 2024-08-30 | End: 2024-08-30 | Stop reason: HOSPADM

## 2024-08-30 RX ORDER — MIDAZOLAM HYDROCHLORIDE 1 MG/ML
INJECTION, SOLUTION INTRAMUSCULAR; INTRAVENOUS AS NEEDED
Status: DISCONTINUED | OUTPATIENT
Start: 2024-08-30 | End: 2024-08-30

## 2024-08-30 RX ORDER — PHENYLEPHRINE HCL IN 0.9% NACL 0.4MG/10ML
SYRINGE (ML) INTRAVENOUS AS NEEDED
Status: DISCONTINUED | OUTPATIENT
Start: 2024-08-30 | End: 2024-08-30

## 2024-08-30 RX ORDER — ACETAMINOPHEN 325 MG/1
975 TABLET ORAL EVERY 6 HOURS PRN
Status: DISCONTINUED | OUTPATIENT
Start: 2024-08-30 | End: 2024-08-30 | Stop reason: HOSPADM

## 2024-08-30 RX ORDER — PROPOFOL 10 MG/ML
INJECTION, EMULSION INTRAVENOUS AS NEEDED
Status: DISCONTINUED | OUTPATIENT
Start: 2024-08-30 | End: 2024-08-30

## 2024-08-30 RX ORDER — OXYTOCIN/0.9 % SODIUM CHLORIDE 30/500 ML
60 PLASTIC BAG, INJECTION (ML) INTRAVENOUS ONCE AS NEEDED
Status: DISCONTINUED | OUTPATIENT
Start: 2024-08-30 | End: 2024-08-30 | Stop reason: HOSPADM

## 2024-08-30 RX ORDER — LABETALOL HYDROCHLORIDE 5 MG/ML
20 INJECTION, SOLUTION INTRAVENOUS ONCE AS NEEDED
Status: DISCONTINUED | OUTPATIENT
Start: 2024-08-30 | End: 2024-08-30 | Stop reason: HOSPADM

## 2024-08-30 RX ORDER — SODIUM CHLORIDE, SODIUM LACTATE, POTASSIUM CHLORIDE, CALCIUM CHLORIDE 600; 310; 30; 20 MG/100ML; MG/100ML; MG/100ML; MG/100ML
125 INJECTION, SOLUTION INTRAVENOUS CONTINUOUS
Status: DISCONTINUED | OUTPATIENT
Start: 2024-08-30 | End: 2024-08-30 | Stop reason: HOSPADM

## 2024-08-30 RX ORDER — CARBOPROST TROMETHAMINE 250 UG/ML
250 INJECTION, SOLUTION INTRAMUSCULAR ONCE AS NEEDED
Status: COMPLETED | OUTPATIENT
Start: 2024-08-30 | End: 2024-08-30

## 2024-08-30 RX ORDER — MISOPROSTOL 200 UG/1
800 TABLET ORAL ONCE AS NEEDED
Status: COMPLETED | OUTPATIENT
Start: 2024-08-30 | End: 2024-08-30

## 2024-08-30 RX ORDER — ONDANSETRON HYDROCHLORIDE 2 MG/ML
4 INJECTION, SOLUTION INTRAVENOUS EVERY 6 HOURS PRN
Status: DISCONTINUED | OUTPATIENT
Start: 2024-08-30 | End: 2024-08-30 | Stop reason: HOSPADM

## 2024-08-30 SDOH — SOCIAL STABILITY: SOCIAL INSECURITY: HAVE YOU HAD ANY THOUGHTS OF HARMING ANYONE ELSE?: NO

## 2024-08-30 SDOH — HEALTH STABILITY: MENTAL HEALTH: HAVE YOU USED ANY PRESCRIPTION DRUGS OTHER THAN PRESCRIBED IN THE PAST 12 MONTHS?: NO

## 2024-08-30 SDOH — SOCIAL STABILITY: SOCIAL INSECURITY: PHYSICAL ABUSE: YES, PAST (COMMENT)

## 2024-08-30 SDOH — HEALTH STABILITY: MENTAL HEALTH: WISH TO BE DEAD (PAST 1 MONTH): NO

## 2024-08-30 SDOH — SOCIAL STABILITY: SOCIAL INSECURITY: ARE YOU OR HAVE YOU BEEN THREATENED OR ABUSED PHYSICALLY, EMOTIONALLY, OR SEXUALLY BY ANYONE?: NO

## 2024-08-30 SDOH — ECONOMIC STABILITY: HOUSING INSECURITY: DO YOU FEEL UNSAFE GOING BACK TO THE PLACE WHERE YOU ARE LIVING?: NO

## 2024-08-30 SDOH — SOCIAL STABILITY: SOCIAL INSECURITY: DOES ANYONE TRY TO KEEP YOU FROM HAVING/CONTACTING OTHER FRIENDS OR DOING THINGS OUTSIDE YOUR HOME?: NO

## 2024-08-30 SDOH — HEALTH STABILITY: MENTAL HEALTH: WERE YOU ABLE TO COMPLETE ALL THE BEHAVIORAL HEALTH SCREENINGS?: YES

## 2024-08-30 SDOH — HEALTH STABILITY: MENTAL HEALTH: NON-SPECIFIC ACTIVE SUICIDAL THOUGHTS (PAST 1 MONTH): NO

## 2024-08-30 SDOH — SOCIAL STABILITY: SOCIAL INSECURITY: HAVE YOU HAD THOUGHTS OF HARMING ANYONE ELSE?: NO

## 2024-08-30 SDOH — HEALTH STABILITY: MENTAL HEALTH: SUICIDAL BEHAVIOR (LIFETIME): NO

## 2024-08-30 SDOH — SOCIAL STABILITY: SOCIAL INSECURITY: DO YOU FEEL ANYONE HAS EXPLOITED OR TAKEN ADVANTAGE OF YOU FINANCIALLY OR OF YOUR PERSONAL PROPERTY?: NO

## 2024-08-30 SDOH — SOCIAL STABILITY: SOCIAL INSECURITY: VERBAL ABUSE: YES, PAST (COMMENT)

## 2024-08-30 SDOH — HEALTH STABILITY: MENTAL HEALTH: HAVE YOU USED ANY SUBSTANCES (CANABIS, COCAINE, HEROIN, HALLUCINOGENS, INHALANTS, ETC.) IN THE PAST 12 MONTHS?: NO

## 2024-08-30 SDOH — SOCIAL STABILITY: SOCIAL INSECURITY: ARE THERE ANY APPARENT SIGNS OF INJURIES/BEHAVIORS THAT COULD BE RELATED TO ABUSE/NEGLECT?: NO

## 2024-08-30 SDOH — SOCIAL STABILITY: SOCIAL INSECURITY: ABUSE SCREEN: ADULT

## 2024-08-30 SDOH — SOCIAL STABILITY: SOCIAL INSECURITY: HAS ANYONE EVER THREATENED TO HURT YOUR FAMILY OR YOUR PETS?: NO

## 2024-08-30 ASSESSMENT — LIFESTYLE VARIABLES
HOW OFTEN DO YOU HAVE 6 OR MORE DRINKS ON ONE OCCASION: NEVER
HOW MANY STANDARD DRINKS CONTAINING ALCOHOL DO YOU HAVE ON A TYPICAL DAY: PATIENT DOES NOT DRINK
SKIP TO QUESTIONS 9-10: 1
HOW OFTEN DO YOU HAVE A DRINK CONTAINING ALCOHOL: NEVER
AUDIT-C TOTAL SCORE: 0
AUDIT-C TOTAL SCORE: 0

## 2024-08-30 ASSESSMENT — PAIN SCALES - GENERAL
PAINLEVEL_OUTOF10: 0 - NO PAIN
PAINLEVEL_OUTOF10: 6
PAINLEVEL_OUTOF10: 0 - NO PAIN
PAINLEVEL_OUTOF10: 6
PAIN_LEVEL: 2
PAINLEVEL_OUTOF10: 0 - NO PAIN

## 2024-08-30 ASSESSMENT — PAIN DESCRIPTION - DESCRIPTORS
DESCRIPTORS: ACHING
DESCRIPTORS: ACHING

## 2024-08-30 ASSESSMENT — PATIENT HEALTH QUESTIONNAIRE - PHQ9: 2. FEELING DOWN, DEPRESSED OR HOPELESS: NOT AT ALL

## 2024-08-30 NOTE — PROGRESS NOTES
Social Work Brief Note       Reason for Visit: Support     History: Anxiety, Depression, polysubstance abuse, housing instability     Plan: SW consult placed due to twin fetal demise at 21 weeks of pregnancy.  SW met with patient at bedside to reintroduce self, and SW role.  SW is familiar with patient from previous admission.  Initially, Sheeba was guarded, but opened up as the visit progressed.  Ms. Morales stated she lives in stable housing with her four children ages, 13, 8, 4, and 11 months.  Current address is [address] [place] [address]. [place] [address].  Household receives Section 8, SNAP, and WIC benefits.  Patient was tearful, and reported feeling overwhelmed.  She has limited support.  Ms. Morales stated her children are her reason for living.  She wants to provide a good life for them.  SW validated feelings, and emotional support provided.  Ms. Morales is already linked to counseling services through [facility].  According to her, she has already advised [facility] staff of her loss.  She plans to continue counseling services, and restart meds postpartum.   Two of patient's minor children are present this admission.  Patient requesting to discharge due to not having anyone available to watch her minor children.  Team aware of patient's request    Email sent to Bereavement Director, Cary Almonte requesting a follow-up support TC.  Resources provided include [facility], [facility], and [facility].  Nursing staff, Lynn advised that [employer] [employer] declined transport request due to patient not having a car seat for minor children.  SW scheduled Lyft through [employer].  Nursing staff expressed concerns over patient taking the bus due to general anesthesia.       Ms. Morales MRN: [id number], is clear to discharge from SW perspective once medically stable     Signature: Tangela Wiseman [facility]

## 2024-08-30 NOTE — SIGNIFICANT EVENT
AMA discharge    Patient now PPD0 from  of twins with fetal demise of both twins, and POD0 from D&C under general anesethesia for retained placenta.    Patient pending safe disposition plan after coordination with social work. Waiting on ride home for herself and 2 other children. L&D team made aware that patient stated she could not wait in hospital/L&D for ride and was adamant that she had to leave. Patient aware that team did not recommend discharge given recent general anesthesia administration. Patient did leave against medical advice and was off the L&D floor by the time our team was alerted. As such, she was unable to be staffed by the day team.    Night team made aware of possibility for patient's return.    Discussed with Dr. Sumner, PGY4. Dr. Love, OB Hospitalist, aware.  Mary Sloan MD, PGY-2

## 2024-08-30 NOTE — L&D DELIVERY NOTE
OB Delivery Note  9/3/2024  Sheeba Morales  34 y.o.      Dante Morales [[id number]]   Vaginal, Spontaneous      Hasmukh Morales [[id number]]   Vaginal, Spontaneous       Gestational Age: 21w1d  /Para:   Quantitative Blood Loss: Admission to Discharge: 1021 mL (2024  2:48 AM - 2024  5:23 PM)    Danet Morales [[id number]]      Labor Events     labor?: Yes  Rupture date/time: 2024 1300  Rupture type: Artificial, Prelabor  Fluid color: Clear  Fluid odor: None  Labor type: Spontaneous Onset of Labor  Complications: None       Labor Event Times    Start pushing date/time: 2024 0340       Labor Length    3rd stage: 1h 27m       Placenta    Placenta delivery date/time: 2024 0509  Placenta removal: Curettage  Placenta appearance: Adherent  Placenta disposition: pathology       Cord    Vessels: 3 vessels  Complications: None  Delayed cord clamping?: No       Lacerations    Episiotomy: None  Perineal laceration: None  Repair suture: None       Anesthesia    Method: None       Operative Delivery    Forceps attempted?: No  Vacuum extractor attempted?: No       Shoulder Dystocia    Shoulder dystocia present?: No       [place] Delivery    Birth date/time: 2024 03:42:00  Delivery type: Vaginal, Spontaneous  Complications: None       Resuscitation    Method: Positive pressure ventilation (PPV)       Apgars    Living status:  Demise  Apgar Component Scores:  1 min.:  5 min.:  10 min.:  15 min.:  20 min.:    Skin color:         Heart rate:         Reflex irritability:         Muscle tone:         Respiratory effort:         Total:                Delivery Providers    Delivering clinician: Yoselyn Armijo MD   Provider Role    Anu Valdez, LEIEZER Delivery Nurse    Valentine Gamble RN Nursery Nurse    Laquita Whitfield MD Resident    Ursula Burt MD Resident               Hasmukh Morales [[id number]]      Labor Events     labor?: Yes  Sac identifier: Sac  2  Rupture date/time: 2024  Rupture type: Artificial  Fluid color: Clear  Fluid odor: None  Labor type: Spontaneous Onset of Labor  Complications: Hemorrhage       Labor Event Times    Dilation complete date/time: 2024  Start pushing date/time: 2024       Labor Length    2nd stage: 0h 07m  3rd stage: 2h 05m       Placenta    Placenta delivery date/time: 2024  Placenta removal: Curettage  Placenta disposition: pathology       Cord    Vessels: 3 vessels  Complications: None       Lacerations    Episiotomy: None  Perineal laceration: None  Repair suture: None       Anesthesia    Method: None       Operative Delivery    Forceps attempted?: No  Vacuum extractor attempted?: No       Shoulder Dystocia    Shoulder dystocia present?: No       [place] Delivery    Time head delivered: 2024 05:40:00  Birth date/time: 2024 05:40:00  Delivery type: Vaginal, Spontaneous  Complications: Hemorrhage       Resuscitation    Method: None       Apgars    Living status:  Demise  Apgar Component Scores:  1 min.:  5 min.:  10 min.:  15 min.:  20 min.:    Skin color:         Heart rate:         Reflex irritability:         Muscle tone:         Respiratory effort:         Total:                Delivery Providers    Delivering clinician: Yoselyn Armijo MD   Provider Role    Anu Valdez, ELIEZER Delivery Nurse    Valentine Gamble, ELIEZER Nursery Nurse    Laquita Whitfield MD Resident    Ursula Burt MD Resident                 33yo  at unknown gestational age with patient reported twins presented to triage with PPROM/contractions. Called to bedside by Dr. Burt, who reported feeling limbs in the vagina. On my exam, approximately 20wk size fetal arm and trunk palpated in vagina with positive fetal movement, transverse back down position. FHT unable to be obtained due to station, umbilical cord unable to be palpated. Patient instructed to push and delivered in one push, breech infant with  signs of life. The cord was clamped and cut, infant wrapped in blankets and handed off to pediatric team for further care. Bedside ultrasound was performed and Twin B visualized, measuring 21.1wga by bedside biometry. This information was relayed to the patient and to the pediatric team. No other dating was able to be obtained via records or per patient, and the pediatric team stopped resuscitation of Twin A. This was discussed with the patient by the NICU fellow, and the patient expressed that she did not want to know anything further about the infant because she was not planning to keep the baby. Patient then moved to a room for further labor management.    Called to bedside at around 0500 for vaginal bleeding soaking through a pad. Approximately 100mL soaked through pad. Cervix 8cm dilated and bag intact. Given amount of bleeding and inevitable delivery, AROM performed. Patient pushed and delivered twin B with signs of life. Cord clamped and cut and infant handed off to awaiting nursing staff.    IM pitocin, hemabate, and IV pitocin bolus given to aid placental delivery. After about 20 minutes, one placenta delivered with gentle umbilical cord traction and maternal expulsive efforts. Bedside ultrasound confirmed retained second placenta. Attempts were made to remove second placenta with gentle traction, maternal expulsion, and manual removal, all of which were unsuccessful. At this time, QBL was 900mL from delivery with unknown starting hemoglobin (patient declining blood draw), and recommendation for dilation and curettage was made for retained placenta. Patient amenable. Risks and benefits of D&C were reviewed and patient was moved back to OR.    Laquita Whitfield MD

## 2024-08-30 NOTE — ANESTHESIA PREPROCEDURE EVALUATION
Patient: Sheeba Morales    Procedure Information       Anesthesia Start Date/Time: 24 0709    Procedure: Dilatation and Curettage    Location: [facility]    Surgeons: Yoselyn Armijo MD          34 y.o.  at 21w1d    Relevant Problems   Anesthesia (within normal limits)      Cardiac   (+) Chest pain      Pulmonary   (+) ELLI (obstructive sleep apnea)      Neuro   (+) Depression affecting pregnancy (HHS-HCC)   (+) Generalized anxiety disorder   (+) Major depressive disorder, single episode, severe without psychotic features (Multi)      GI   (+) GERD (gastroesophageal reflux disease)      /Renal (within normal limits)      Liver   (+) Elevated LFTs      Endocrine   (+) Maternal obesity affecting pregnancy, antepartum (HHS-HCC)   (+) Morbid obesity (Multi)      GYN   (+) Supervision of other high risk pregnancy, antepartum (HHS-HCC)     Pt is NPO after midnight.    Clinical information reviewed:   Tobacco  Allergies  Meds   Med Hx  Surg Hx   Fam Hx  Soc Hx        NPO Detail:  NPO/Void Status  Date of Last Liquid: 24  Date of Last Solid: 24  Time of Last Solid: 0800         Physical Exam    Airway  Mallampati: III  TM distance: >3 FB  Neck ROM: full     Cardiovascular - normal exam  Rhythm: regular  Rate: normal     Dental - normal exam     Pulmonary - normal exam     Abdominal        Anesthesia Plan    History of general anesthesia?: yes  History of complications of general anesthesia?: no    ASA 3     general     intravenous induction   Anesthetic plan and risks discussed with patient.  Use of blood products discussed with patient who consented to blood products.    Plan discussed with CRNA and attending.
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
## 2024-08-30 NOTE — OP NOTE
Date: 2024  OR Location: [facility] 2 OB    Name: Sheeba Morales, : 1990, Age: 34 y.o., MRN: [id number], Sex: female    Diagnosis  Postpartum from vaginal delivery of twin gestation    Retained placenta Same diagnoses     Procedures    * Dilatation and Curettage  Intrauterine device placement    Surgeons      * Yoselyn Armijo - Primary    Resident/Fellow/Other Assistant:  Mary Sloan - Resident    Procedure Summary  Anesthesia: General  ASA: III  Anesthesia Staff: Anesthesiologist: Adam Aclazar MD  CRNA: SAUNDRA Alan-CRNA  Estimated Blood Loss: 50 mL  Intra-op Medications: * Intraprocedure medication information is unavailable because the case start and end events have not been set *      Intraprocedure I/O Totals       None           Specimen: Placenta     Procedure Details:  The patient was seen in the preoperative area. The site of surgery was properly noted/marked if necessary per policy. The patient has been actively warmed in preoperative area. 2g of Ancef were previously administered. Venous thrombosis prophylaxis have been ordered including bilateral sequential compression devices    Findings: Bedside US performed showing thin uterine stripe at fundus and placenta within the lower uterine segment. On exam, cervix dilated 5cm with placenta felt, but unable to be manually extracted.      Operative Details:  The patient was taken to the operating room where GA was administered. She was then positioned in the dorsal lithotomy position and was then prepped and draped in the normal sterile fashion. A weighted speculum was placed for visualization of the cervix. Ring forceps were placed on the anterior lip of the cervix for traction . The cervix was noted to be dilated to 5 cm with the placenta in the lower uterine segment. Manual extraction of the placenta was attempted, but unsuccessful. A size 8 suction curette was placed into the uterus under ultrasound guidance and 3 passes were  made until US showed a thin endometrial stripe. A trickle of bleeding was noted from the cervix and bimanual massage was performed with firming of the lower uterine segment and persistent thin stripe on US. 1g of TXA and 800 mcg of rectal Cytotec was administered. Prior to completion of the procedure, a Liletta IUD was placed at the fundus under US guidance.    All counts were correct.  The specimen was sent to pathology.  Dr. Yanez was present for the entire procedure. The patient was taken from the operating room in stable condition after reversal of anesthesia. She was returned to a labor room for continued recovery with plans after fully recovered from anesthesia.    Complications:  None; patient tolerated the procedure well.

## 2024-08-30 NOTE — ANESTHESIA POSTPROCEDURE EVALUATION
Patient: Sheeba Morales    Procedure Summary       Date: 08/30/24 Room / Location: [facility] [place] 2 OB    Anesthesia Start: 0709 Anesthesia Stop: 0814    Procedure: Dilatation and Curettage Diagnosis: (Emergent OB Procedure)    Surgeons: Yoselyn Armijo MD Responsible Provider: Adam Alcazar MD    Anesthesia Type: general ASA Status: 3            Anesthesia Type: general    Vitals Value Taken Time   /71 08/30/24 0811   Temp 36 08/30/24 0814   Pulse 93 08/30/24 0811   Resp 14 08/30/24 0814   SpO2 100 % 08/30/24 0810       Anesthesia Post Evaluation    Patient location during evaluation: floor  Patient participation: complete - patient participated  Level of consciousness: awake and alert  Pain score: 2  Pain management: adequate  Airway patency: patent  Cardiovascular status: acceptable  Respiratory status: acceptable  Hydration status: acceptable  Postoperative Nausea and Vomiting: none        No notable events documented.

## 2024-08-30 NOTE — ED TRIAGE NOTES
8 months pregnant,  - possibly pregnant with twins, loss of water at 1pm, contractions, unknown JOSE FRANCISCO

## 2024-08-30 NOTE — SIGNIFICANT EVENT
Called to triage for patient known di/di twin gestation based on evaluation in March per patient report. Presented to triage with complaints pprom and contractions. Upon vaginal exam by resident, fetal limbs were palpated in the vagina. BUS demonstrated FHT on baby B. However due to the location of the baby in the vagina, were not able to assess for heart tones on Baby A.  Upon exam, patient uncomfortable causing her to bear down but not full pushing. Vaginal exam small fetal arm at the introitus, transverse back down. Swept fetal arms to the fetal sides, creating a jeevan breech and with one push the entire fetus delivered unobstructed and easily. Baby covered in 2 blankets as there was a pulse in the cord and movement of limbs and mouth. Cord clamped and cut and baby handed of to the NICU team that arrived for the code pink as the gestational age was uncertain.  Patient checked by resident and amniotic sac of baby B intact and found to be 6 cm dilated. BUS by femur length and fetal head biometry provided an approximate gestational age of 21 weeks. Patient requesting to leave. Advised to stay. Advised patient that we will move her and her 2 children to a quiet private room to relax and rest with additionally providing us the ability to monitor her. She consented. She does not want to know anything about the baby born or the fetus B. She states that she had been planning for adoption and does not want to know any information. Refuses IV, blood work or any interventions at this time.  NICU stopped initial resuscitative measures due to periviability.

## 2024-08-30 NOTE — ANESTHESIA PROCEDURE NOTES
Airway  Date/Time: 8/30/2024 7:21 AM  Urgency: elective    Airway not difficult    Staffing  Performed: CRNA   Authorized by: Adam Alcazar MD    Performed by: SAUNDRA Alan-PATSY  Patient location during procedure: OR    Indications and Patient Condition  Indications for airway management: anesthesia and airway protection  Spontaneous ventilation: present  Sedation level: no sedation  Preoxygenated: yes  Patient position: sniffing  Mask difficulty assessment: 0 - not attempted    Final Airway Details  Final airway type: endotracheal airway      Successful airway: ETT  Cuffed: yes   Successful intubation technique: video laryngoscopy  Facilitating devices/methods: intubating stylet and cricoid pressure  Endotracheal tube insertion site: oral  Blade: Omar  Blade size: #4  ETT size (mm): 6.0  Cormack-Lehane Classification: grade I - full view of glottis  Placement verified by: chest auscultation and capnometry   Measured from: lips  ETT to lips (cm): 22  Number of attempts at approach: 1

## 2024-09-01 LAB — GP B STREP GENITAL QL CULT: NORMAL

## 2024-09-02 LAB
BLOOD EXPIRATION DATE: NORMAL
BLOOD EXPIRATION DATE: NORMAL
DISPENSE STATUS: NORMAL
DISPENSE STATUS: NORMAL
PRODUCT BLOOD TYPE: 6200
PRODUCT BLOOD TYPE: 6200
PRODUCT CODE: NORMAL
PRODUCT CODE: NORMAL
UNIT ABO: NORMAL
UNIT ABO: NORMAL
UNIT NUMBER: NORMAL
UNIT NUMBER: NORMAL
UNIT RH: NORMAL
UNIT RH: NORMAL
UNIT VOLUME: 350
UNIT VOLUME: 350
XM INTEP: NORMAL
XM INTEP: NORMAL

## 2024-09-10 LAB
LABORATORY COMMENT REPORT: NORMAL
PATH REPORT.COMMENTS IMP SPEC: NORMAL
PATH REPORT.FINAL DX SPEC: NORMAL
PATH REPORT.GROSS SPEC: NORMAL
PATH REPORT.RELEVANT HX SPEC: NORMAL
PATH REPORT.TOTAL CANCER: NORMAL